# Patient Record
Sex: FEMALE | Race: WHITE | NOT HISPANIC OR LATINO | URBAN - METROPOLITAN AREA
[De-identification: names, ages, dates, MRNs, and addresses within clinical notes are randomized per-mention and may not be internally consistent; named-entity substitution may affect disease eponyms.]

---

## 2021-10-26 ENCOUNTER — OFFICE VISIT (OUTPATIENT)
Dept: URBAN - METROPOLITAN AREA CLINIC 121 | Facility: CLINIC | Age: 86
End: 2021-10-26

## 2022-02-14 ENCOUNTER — OFFICE VISIT (OUTPATIENT)
Dept: URBAN - METROPOLITAN AREA CLINIC 63 | Facility: CLINIC | Age: 87
End: 2022-02-14

## 2022-02-23 LAB
CALPROTECTIN, STOOL: (no result)
PANCREATIC ELASTASE-1: (no result)

## 2022-02-25 ENCOUNTER — OFFICE VISIT (OUTPATIENT)
Dept: URBAN - METROPOLITAN AREA CLINIC 63 | Facility: CLINIC | Age: 87
End: 2022-02-25

## 2022-03-11 ENCOUNTER — OFFICE VISIT (OUTPATIENT)
Dept: URBAN - METROPOLITAN AREA CLINIC 63 | Facility: CLINIC | Age: 87
End: 2022-03-11

## 2022-03-25 ENCOUNTER — OFFICE VISIT (OUTPATIENT)
Dept: URBAN - METROPOLITAN AREA CLINIC 63 | Facility: CLINIC | Age: 87
End: 2022-03-25

## 2022-04-11 ENCOUNTER — OFFICE VISIT (OUTPATIENT)
Dept: URBAN - METROPOLITAN AREA CLINIC 63 | Facility: CLINIC | Age: 87
End: 2022-04-11

## 2022-07-09 ENCOUNTER — TELEPHONE ENCOUNTER (OUTPATIENT)
Dept: URBAN - METROPOLITAN AREA CLINIC 121 | Facility: CLINIC | Age: 87
End: 2022-07-09

## 2022-07-09 RX ORDER — CELECOXIB 200 MG/1
CAPSULE ORAL ONCE A DAY
Refills: 0 | OUTPATIENT
Start: 2022-02-14 | End: 2022-03-11

## 2022-07-09 RX ORDER — CELECOXIB 200 MG
CAPSULE ORAL
Refills: 0 | OUTPATIENT
Start: 2014-06-24 | End: 2015-11-03

## 2022-07-09 RX ORDER — ALENDRONATE SODIUM 70 MG/1
TABLET ORAL
Refills: 0 | OUTPATIENT
Start: 2014-06-24 | End: 2015-11-03

## 2022-07-09 RX ORDER — CIPROFLOXACIN HCL 500 MG
TWICE A DAY TABLET ORAL TWICE A DAY
Refills: 0 | OUTPATIENT
Start: 2015-11-03 | End: 2019-11-19

## 2022-07-09 RX ORDER — AMOXICILLIN AND CLAVULANATE POTASSIUM 875; 125 MG/1; MG/1
TWICE A DAY TABLET ORAL TWICE A DAY
Refills: 0 | OUTPATIENT
Start: 2019-11-19 | End: 2022-02-14

## 2022-07-09 RX ORDER — OMEPRAZOLE 20 MG/1
CAPSULE, DELAYED RELEASE ORAL
Refills: 0 | OUTPATIENT
Start: 2015-11-03 | End: 2022-02-14

## 2022-07-09 RX ORDER — TRAMADOL HYDROCHLORIDE 50 MG/1
TABLET ORAL
Refills: 0 | OUTPATIENT
Start: 2014-06-24 | End: 2015-11-03

## 2022-07-09 RX ORDER — OMEPRAZOLE 20 MG/1
CAPSULE, DELAYED RELEASE ORAL
Refills: 0 | OUTPATIENT
Start: 2014-06-24 | End: 2015-11-03

## 2022-07-09 RX ORDER — PNV NO.153/FA/OM3/DHA/EPA/FISH 400-35-25
TABLET,CHEWABLE ORAL ONCE A DAY
Refills: 0 | OUTPATIENT
Start: 2022-02-14 | End: 2022-02-25

## 2022-07-09 RX ORDER — PRAVASTATIN SODIUM 80 MG/1
TABLET ORAL
Refills: 0 | OUTPATIENT
Start: 2021-11-02 | End: 2022-04-11

## 2022-07-09 RX ORDER — LISINOPRIL AND HYDROCHLOROTHIAZIDE TABLETS 20; 12.5 MG/1; MG/1
TABLET ORAL ONCE A DAY
Refills: 0 | OUTPATIENT
Start: 2022-03-11 | End: 2022-04-11

## 2022-07-09 RX ORDER — ROPINIROLE 2 MG/1
TABLET, FILM COATED ORAL
Refills: 0 | OUTPATIENT
Start: 2014-06-24 | End: 2015-11-03

## 2022-07-09 RX ORDER — PRAVASTATIN SODIUM 80 MG/1
TABLET ORAL
Refills: 0 | OUTPATIENT
Start: 2014-06-24 | End: 2015-11-03

## 2022-07-09 RX ORDER — LISINOPRIL AND HYDROCHLOROTHIAZIDE TABLETS 20; 12.5 MG/1; MG/1
TABLET ORAL
Refills: 0 | OUTPATIENT
Start: 2022-01-10 | End: 2022-03-11

## 2022-07-09 RX ORDER — PRAVASTATIN SODIUM 80 MG/1
TABLET ORAL
Refills: 0 | OUTPATIENT
Start: 2015-11-03 | End: 2022-02-14

## 2022-07-09 RX ORDER — GABAPENTIN 300 MG/1
CAPSULE ORAL ONCE A DAY
Refills: 0 | OUTPATIENT
Start: 2022-02-14 | End: 2022-02-25

## 2022-07-09 RX ORDER — PRAMIPEXOLE DIHYDROCHLORIDE 0.5 MG/1
TABLET ORAL
Refills: 0 | OUTPATIENT
Start: 2019-11-14 | End: 2022-02-14

## 2022-07-09 RX ORDER — GABAPENTIN 300 MG/1
CAPSULE ORAL ONCE A DAY
Refills: 0 | OUTPATIENT
Start: 2022-02-25 | End: 2022-03-11

## 2022-07-09 RX ORDER — DIPHENOXYLATE HYDROCHLORIDE AND ATROPINE SULFATE 2.5; .025 MG/1; MG/1
TABLET ORAL
Refills: 0 | OUTPATIENT
Start: 2022-01-25 | End: 2022-03-11

## 2022-07-09 RX ORDER — AMOXICILLIN 875 MG/1
TABLET, COATED ORAL
Refills: 0 | OUTPATIENT
Start: 2014-06-24 | End: 2015-11-03

## 2022-07-09 RX ORDER — GABAPENTIN 300 MG/1
CAPSULE ORAL ONCE A DAY
Refills: 0 | OUTPATIENT
Start: 2022-03-11 | End: 2022-04-11

## 2022-07-09 RX ORDER — PRAMIPEXOLE DIHYDROCHLORIDE 0.5 MG/1
TABLET ORAL ONCE A DAY
Refills: 0 | OUTPATIENT
Start: 2022-02-25 | End: 2022-03-11

## 2022-07-09 RX ORDER — TRAMADOL HYDROCHLORIDE 50 MG/1
TABLET ORAL
Refills: 0 | OUTPATIENT
Start: 2015-11-03 | End: 2022-02-14

## 2022-07-09 RX ORDER — PRAMIPEXOLE DIHYDROCHLORIDE 0.5 MG/1
TABLET ORAL ONCE A DAY
Refills: 0 | OUTPATIENT
Start: 2021-08-30 | End: 2022-02-25

## 2022-07-09 RX ORDER — CELECOXIB 200 MG
CAPSULE ORAL
Refills: 0 | OUTPATIENT
Start: 2015-11-03 | End: 2022-02-14

## 2022-07-09 RX ORDER — ALBUTEROL SULFATE 90 UG/1
AEROSOL, METERED RESPIRATORY (INHALATION) TAKE AS DIRECTED
Refills: 0 | OUTPATIENT
Start: 2015-11-03 | End: 2022-02-14

## 2022-07-09 RX ORDER — PANCRELIPASE 36000; 180000; 114000 [USP'U]/1; [USP'U]/1; [USP'U]/1
USE AS DIRECTED TAKE 2 CAPSULES WITH EACH MEAL AND 1 WITH SNACKS UP TO 8 CAPSULES PER DAY CAPSULE, DELAYED RELEASE PELLETS ORAL
Refills: 1 | OUTPATIENT
Start: 2022-02-14 | End: 2022-03-16

## 2022-07-09 RX ORDER — DIPHENHYDRAMINE HCL 50 MG/1
CAPSULE ORAL ONCE A DAY
Refills: 0 | OUTPATIENT
Start: 2022-02-14 | End: 2022-02-25

## 2022-07-09 RX ORDER — PANCRELIPASE 36000; 180000; 114000 [USP'U]/1; [USP'U]/1; [USP'U]/1
CAPSULE, DELAYED RELEASE PELLETS ORAL
Refills: 0 | OUTPATIENT
Start: 2022-02-15 | End: 2022-03-11

## 2022-07-09 RX ORDER — CALCIUM CARBONATE/VITAMIN D3 600 MG-20
TABLET ORAL ONCE A DAY
Refills: 0 | OUTPATIENT
Start: 2022-02-14 | End: 2022-02-25

## 2022-07-09 RX ORDER — METRONIDAZOLE 500 MG/1
THREE TIMES A DAY TABLET ORAL THREE TIMES A DAY
Refills: 0 | OUTPATIENT
Start: 2015-11-03 | End: 2019-11-19

## 2022-07-09 RX ORDER — ROPINIROLE 2 MG/1
TABLET, FILM COATED ORAL
Refills: 0 | OUTPATIENT
Start: 2015-11-03 | End: 2019-11-19

## 2022-07-09 RX ORDER — CELECOXIB 200 MG/1
CAPSULE ORAL ONCE A DAY
Refills: 0 | OUTPATIENT
Start: 2022-03-11 | End: 2022-04-11

## 2022-07-09 RX ORDER — PANCRELIPASE 36000; 180000; 114000 [USP'U]/1; [USP'U]/1; [USP'U]/1
USE AS DIRECTED TAKE 5 CAPSULES WITH EACH MEAL AND 2 WITH SNACKS CAPSULE, DELAYED RELEASE PELLETS ORAL
Refills: 11 | OUTPATIENT
Start: 2022-04-01 | End: 2022-04-11

## 2022-07-09 RX ORDER — PRAMIPEXOLE DIHYDROCHLORIDE 0.5 MG/1
TABLET ORAL ONCE A DAY
Refills: 0 | OUTPATIENT
Start: 2022-03-11 | End: 2022-04-11

## 2022-07-09 RX ORDER — PANCRELIPASE 36000; 180000; 114000 [USP'U]/1; [USP'U]/1; [USP'U]/1
USE AS DIRECTED TAKE 5 CAPSULES WITH EACH MEAL AND 2 WITH SNACKS CAPSULE, DELAYED RELEASE PELLETS ORAL
Refills: 11 | OUTPATIENT
Start: 2022-03-16 | End: 2022-04-01

## 2022-07-10 ENCOUNTER — TELEPHONE ENCOUNTER (OUTPATIENT)
Dept: URBAN - METROPOLITAN AREA CLINIC 121 | Facility: CLINIC | Age: 87
End: 2022-07-10

## 2022-07-10 RX ORDER — CELECOXIB 200 MG/1
CAPSULE ORAL ONCE A DAY
Refills: 0 | Status: ACTIVE | COMMUNITY
Start: 2022-04-11

## 2022-07-10 RX ORDER — PANCRELIPASE 36000; 180000; 114000 [USP'U]/1; [USP'U]/1; [USP'U]/1
TAKE 2 CAPSULES BY MOUTH WITH EACH MEAL AND 1 CAPSULE WITH SNACKS. UP TO 8 CAPSULES PER DAY CAPSULE, DELAYED RELEASE PELLETS ORAL
Refills: 11 | Status: ACTIVE | COMMUNITY
Start: 2022-05-04

## 2022-07-10 RX ORDER — PRAMIPEXOLE DIHYDROCHLORIDE 0.5 MG/1
TABLET ORAL ONCE A DAY
Refills: 0 | Status: ACTIVE | COMMUNITY
Start: 2022-04-11

## 2022-07-10 RX ORDER — GABAPENTIN 300 MG/1
CAPSULE ORAL ONCE A DAY
Refills: 0 | Status: ACTIVE | COMMUNITY
Start: 2022-04-11

## 2022-07-19 ENCOUNTER — OFFICE VISIT (OUTPATIENT)
Dept: URBAN - METROPOLITAN AREA CLINIC 63 | Facility: CLINIC | Age: 87
End: 2022-07-19
Payer: MEDICARE

## 2022-07-19 ENCOUNTER — WEB ENCOUNTER (OUTPATIENT)
Dept: URBAN - METROPOLITAN AREA CLINIC 63 | Facility: CLINIC | Age: 87
End: 2022-07-19

## 2022-07-19 VITALS
HEART RATE: 95 BPM | TEMPERATURE: 96.3 F | HEIGHT: 61 IN | OXYGEN SATURATION: 97 % | DIASTOLIC BLOOD PRESSURE: 78 MMHG | WEIGHT: 167.2 LBS | BODY MASS INDEX: 31.57 KG/M2 | SYSTOLIC BLOOD PRESSURE: 135 MMHG

## 2022-07-19 DIAGNOSIS — K86.81 EXOCRINE PANCREATIC INSUFFICIENCY: ICD-10-CM

## 2022-07-19 PROCEDURE — 99202 OFFICE O/P NEW SF 15 MIN: CPT | Performed by: NURSE PRACTITIONER

## 2022-07-19 PROCEDURE — 99212 OFFICE O/P EST SF 10 MIN: CPT | Performed by: NURSE PRACTITIONER

## 2022-07-19 RX ORDER — PANCRELIPASE 36000; 180000; 114000 [USP'U]/1; [USP'U]/1; [USP'U]/1
TAKE 2 CAPSULES BY MOUTH WITH EACH MEAL AND 1 CAPSULE WITH SNACKS. UP TO 8 CAPSULES PER DAY CAPSULE, DELAYED RELEASE PELLETS ORAL
Refills: 11 | Status: ACTIVE | COMMUNITY
Start: 2022-05-04

## 2022-07-19 RX ORDER — PRAMIPEXOLE DIHYDROCHLORIDE 0.5 MG/1
TABLET ORAL ONCE A DAY
Refills: 0 | Status: ACTIVE | COMMUNITY
Start: 2022-04-11

## 2022-07-19 RX ORDER — CELECOXIB 200 MG/1
CAPSULE ORAL ONCE A DAY
Refills: 0 | Status: ACTIVE | COMMUNITY
Start: 2022-04-11

## 2022-07-19 RX ORDER — GABAPENTIN 300 MG/1
CAPSULE ORAL ONCE A DAY
Refills: 0 | Status: ACTIVE | COMMUNITY
Start: 2022-04-11

## 2022-07-19 NOTE — HPI-TODAY'S VISIT:
Doing great on vital nutrients enzymes 5 with meals, diarrhea is resolved ct showed just pancreatic atrophy, elastase was very low at 24

## 2022-07-19 NOTE — PHYSICAL EXAM GASTROINTESTINAL
Abdomen , soft,  mild LLQ tenderness, nondistended , no guarding or rigidity , no masses palpable , normal bowel sounds , Liver and Spleen , no hepatomegaly present , no hepatosplenomegaly , liver nontender , spleen not palpable, No Ascites, No hernia

## 2023-01-13 ENCOUNTER — WEB ENCOUNTER (OUTPATIENT)
Dept: URBAN - METROPOLITAN AREA CLINIC 63 | Facility: CLINIC | Age: 88
End: 2023-01-13

## 2023-01-13 ENCOUNTER — OFFICE VISIT (OUTPATIENT)
Dept: URBAN - METROPOLITAN AREA CLINIC 63 | Facility: CLINIC | Age: 88
End: 2023-01-13
Payer: MEDICARE

## 2023-01-13 VITALS
WEIGHT: 178 LBS | DIASTOLIC BLOOD PRESSURE: 84 MMHG | TEMPERATURE: 97.4 F | BODY MASS INDEX: 33.61 KG/M2 | HEART RATE: 91 BPM | OXYGEN SATURATION: 97 % | SYSTOLIC BLOOD PRESSURE: 140 MMHG | HEIGHT: 61 IN

## 2023-01-13 DIAGNOSIS — K86.89 PANCREATIC ATROPHY: ICD-10-CM

## 2023-01-13 DIAGNOSIS — R19.7 DIARRHEA, UNSPECIFIED TYPE: ICD-10-CM

## 2023-01-13 DIAGNOSIS — K86.81 EXOCRINE PANCREATIC INSUFFICIENCY: ICD-10-CM

## 2023-01-13 PROCEDURE — 99213 OFFICE O/P EST LOW 20 MIN: CPT | Performed by: NURSE PRACTITIONER

## 2023-01-13 RX ORDER — PANCRELIPASE 36000; 180000; 114000 [USP'U]/1; [USP'U]/1; [USP'U]/1
AS DIRECTED CAPSULE, DELAYED RELEASE PELLETS ORAL
Qty: 1500 | Refills: 3 | OUTPATIENT
Start: 2023-01-13 | End: 2024-01-08

## 2023-01-13 RX ORDER — ASCORBIC ACID/ASCORBATE SODIUM 500 MG
1 TABLET TABLET,CHEWABLE ORAL ONCE A DAY
Status: ACTIVE | COMMUNITY

## 2023-01-13 RX ORDER — CELECOXIB 200 MG/1
CAPSULE ORAL ONCE A DAY
Refills: 0 | Status: ACTIVE | COMMUNITY
Start: 2022-04-11

## 2023-01-13 RX ORDER — LISINOPRIL AND HYDROCHLOROTHIAZIDE 20; 12.5 MG/1; MG/1
1 TABLET TABLET ORAL ONCE A DAY
Status: ACTIVE | COMMUNITY

## 2023-01-13 RX ORDER — CHOLECALCIFEROL (VITAMIN D3) 25 MCG
1 TABLET TABLET,CHEWABLE ORAL ONCE A DAY
Status: ACTIVE | COMMUNITY

## 2023-01-13 RX ORDER — GABAPENTIN 300 MG/1
AS DIRECTED CAPSULE ORAL
Refills: 0 | Status: ACTIVE | COMMUNITY
Start: 2022-04-11

## 2023-01-13 RX ORDER — TRAMADOL HYDROCHLORIDE 50 MG/1
1 TABLET AS NEEDED TABLET, FILM COATED ORAL ONCE A DAY
Status: ACTIVE | COMMUNITY

## 2023-01-13 RX ORDER — PRAMIPEXOLE DIHYDROCHLORIDE 0.5 MG/1
TABLET ORAL ONCE A DAY
Refills: 0 | Status: ACTIVE | COMMUNITY
Start: 2022-04-11

## 2023-01-13 NOTE — HPI-TODAY'S VISIT:
Previously doing great on vital nutrients enzymes 5 with meals, diarrhea had resolved Now having diarrhea 2 days per week, urgent and some incontinence. ct showed just pancreatic atrophy, elastase was very low at 24

## 2023-02-21 ENCOUNTER — DASHBOARD ENCOUNTERS (OUTPATIENT)
Age: 88
End: 2023-02-21

## 2023-02-22 ENCOUNTER — TELEPHONE ENCOUNTER (OUTPATIENT)
Dept: URBAN - METROPOLITAN AREA CLINIC 7 | Facility: CLINIC | Age: 88
End: 2023-02-22

## 2023-02-28 ENCOUNTER — OFFICE VISIT (OUTPATIENT)
Dept: URBAN - METROPOLITAN AREA CLINIC 63 | Facility: CLINIC | Age: 88
End: 2023-02-28

## 2023-02-28 RX ORDER — GABAPENTIN 300 MG/1
AS DIRECTED CAPSULE ORAL
Refills: 0 | COMMUNITY
Start: 2022-04-11

## 2023-02-28 RX ORDER — PANCRELIPASE 36000; 180000; 114000 [USP'U]/1; [USP'U]/1; [USP'U]/1
AS DIRECTED CAPSULE, DELAYED RELEASE PELLETS ORAL
Qty: 1500 | Refills: 3 | COMMUNITY
Start: 2023-01-13 | End: 2024-01-08

## 2023-02-28 RX ORDER — TRAMADOL HYDROCHLORIDE 50 MG/1
1 TABLET AS NEEDED TABLET, FILM COATED ORAL ONCE A DAY
COMMUNITY

## 2023-02-28 RX ORDER — CELECOXIB 200 MG/1
CAPSULE ORAL ONCE A DAY
Refills: 0 | COMMUNITY
Start: 2022-04-11

## 2023-02-28 RX ORDER — ASCORBIC ACID/ASCORBATE SODIUM 500 MG
1 TABLET TABLET,CHEWABLE ORAL ONCE A DAY
COMMUNITY

## 2023-02-28 RX ORDER — PRAMIPEXOLE DIHYDROCHLORIDE 0.5 MG/1
TABLET ORAL ONCE A DAY
Refills: 0 | COMMUNITY
Start: 2022-04-11

## 2023-02-28 RX ORDER — LISINOPRIL AND HYDROCHLOROTHIAZIDE 20; 12.5 MG/1; MG/1
1 TABLET TABLET ORAL ONCE A DAY
COMMUNITY

## 2023-02-28 RX ORDER — CHOLECALCIFEROL (VITAMIN D3) 25 MCG
1 TABLET TABLET,CHEWABLE ORAL ONCE A DAY
COMMUNITY

## 2023-09-12 ENCOUNTER — TELEPHONE ENCOUNTER (OUTPATIENT)
Dept: URBAN - METROPOLITAN AREA CLINIC 63 | Facility: CLINIC | Age: 88
End: 2023-09-12

## 2023-09-21 ENCOUNTER — CLAIMS CREATED FROM THE CLAIM WINDOW (OUTPATIENT)
Dept: URBAN - METROPOLITAN AREA MEDICAL CENTER 14 | Facility: MEDICAL CENTER | Age: 88
End: 2023-09-21
Payer: MEDICARE

## 2023-09-21 DIAGNOSIS — K52.9 ENTERITIS: ICD-10-CM

## 2023-09-21 DIAGNOSIS — R10.9 ABDOMINAL PAIN: ICD-10-CM

## 2023-09-21 DIAGNOSIS — R19.7 DIARRHEA: ICD-10-CM

## 2023-09-21 PROCEDURE — 99223 1ST HOSP IP/OBS HIGH 75: CPT | Performed by: INTERNAL MEDICINE

## 2023-09-26 ENCOUNTER — OFFICE VISIT (OUTPATIENT)
Dept: URBAN - METROPOLITAN AREA CLINIC 63 | Facility: CLINIC | Age: 88
End: 2023-09-26